# Patient Record
Sex: FEMALE | Race: BLACK OR AFRICAN AMERICAN | NOT HISPANIC OR LATINO | URBAN - METROPOLITAN AREA
[De-identification: names, ages, dates, MRNs, and addresses within clinical notes are randomized per-mention and may not be internally consistent; named-entity substitution may affect disease eponyms.]

---

## 2018-09-13 ENCOUNTER — EMERGENCY (EMERGENCY)
Facility: HOSPITAL | Age: 48
LOS: 1 days | Discharge: HOME | End: 2018-09-13
Attending: EMERGENCY MEDICINE
Payer: MEDICAID

## 2018-09-13 VITALS
HEART RATE: 96 BPM | SYSTOLIC BLOOD PRESSURE: 130 MMHG | OXYGEN SATURATION: 100 % | RESPIRATION RATE: 18 BRPM | DIASTOLIC BLOOD PRESSURE: 81 MMHG | TEMPERATURE: 99 F

## 2018-09-13 VITALS
WEIGHT: 250 LBS | SYSTOLIC BLOOD PRESSURE: 127 MMHG | OXYGEN SATURATION: 100 % | HEART RATE: 123 BPM | HEIGHT: 66 IN | RESPIRATION RATE: 18 BRPM | DIASTOLIC BLOOD PRESSURE: 74 MMHG | TEMPERATURE: 100 F

## 2018-09-13 DIAGNOSIS — N92.1 EXCESSIVE AND FREQUENT MENSTRUATION WITH IRREGULAR CYCLE: ICD-10-CM

## 2018-09-13 DIAGNOSIS — D25.9 LEIOMYOMA OF UTERUS, UNSPECIFIED: ICD-10-CM

## 2018-09-13 LAB
ANION GAP SERPL CALC-SCNC: 7 MMOL/L — SIGNIFICANT CHANGE UP (ref 5–17)
APPEARANCE UR: CLEAR — SIGNIFICANT CHANGE UP
BASOPHILS # BLD AUTO: 0.1 K/UL — SIGNIFICANT CHANGE UP (ref 0–0.2)
BASOPHILS NFR BLD AUTO: 0.7 % — SIGNIFICANT CHANGE UP (ref 0–2)
BILIRUB UR-MCNC: NEGATIVE — SIGNIFICANT CHANGE UP
BUN SERPL-MCNC: 17 MG/DL — SIGNIFICANT CHANGE UP (ref 7–18)
CALCIUM SERPL-MCNC: 8.3 MG/DL — LOW (ref 8.4–10.5)
CHLORIDE SERPL-SCNC: 108 MMOL/L — SIGNIFICANT CHANGE UP (ref 96–108)
CO2 SERPL-SCNC: 24 MMOL/L — SIGNIFICANT CHANGE UP (ref 22–31)
COLOR SPEC: YELLOW — SIGNIFICANT CHANGE UP
CREAT SERPL-MCNC: 0.83 MG/DL — SIGNIFICANT CHANGE UP (ref 0.5–1.3)
DIFF PNL FLD: ABNORMAL
EOSINOPHIL # BLD AUTO: 0 K/UL — SIGNIFICANT CHANGE UP (ref 0–0.5)
EOSINOPHIL NFR BLD AUTO: 0.2 % — SIGNIFICANT CHANGE UP (ref 0–6)
GLUCOSE SERPL-MCNC: 141 MG/DL — HIGH (ref 70–99)
GLUCOSE UR QL: NEGATIVE — SIGNIFICANT CHANGE UP
HCG SERPL-ACNC: <1 MIU/ML — SIGNIFICANT CHANGE UP
HCG UR QL: NEGATIVE — SIGNIFICANT CHANGE UP
HCT VFR BLD CALC: 28.3 % — LOW (ref 34.5–45)
HGB BLD-MCNC: 8.3 G/DL — LOW (ref 11.5–15.5)
KETONES UR-MCNC: NEGATIVE — SIGNIFICANT CHANGE UP
LEUKOCYTE ESTERASE UR-ACNC: NEGATIVE — SIGNIFICANT CHANGE UP
LYMPHOCYTES # BLD AUTO: 1.3 K/UL — SIGNIFICANT CHANGE UP (ref 1–3.3)
LYMPHOCYTES # BLD AUTO: 10.8 % — LOW (ref 13–44)
MCHC RBC-ENTMCNC: 20.6 PG — LOW (ref 27–34)
MCHC RBC-ENTMCNC: 29.3 GM/DL — LOW (ref 32–36)
MCV RBC AUTO: 70.1 FL — LOW (ref 80–100)
MONOCYTES # BLD AUTO: 0.8 K/UL — SIGNIFICANT CHANGE UP (ref 0–0.9)
MONOCYTES NFR BLD AUTO: 6.4 % — SIGNIFICANT CHANGE UP (ref 2–14)
NEUTROPHILS # BLD AUTO: 10.2 K/UL — HIGH (ref 1.8–7.4)
NEUTROPHILS NFR BLD AUTO: 81.8 % — HIGH (ref 43–77)
NITRITE UR-MCNC: NEGATIVE — SIGNIFICANT CHANGE UP
PH UR: 5 — SIGNIFICANT CHANGE UP (ref 5–8)
PLATELET # BLD AUTO: 292 K/UL — SIGNIFICANT CHANGE UP (ref 150–400)
POTASSIUM SERPL-MCNC: 3.7 MMOL/L — SIGNIFICANT CHANGE UP (ref 3.5–5.3)
POTASSIUM SERPL-SCNC: 3.7 MMOL/L — SIGNIFICANT CHANGE UP (ref 3.5–5.3)
PROT UR-MCNC: NEGATIVE — SIGNIFICANT CHANGE UP
RBC # BLD: 4.03 M/UL — SIGNIFICANT CHANGE UP (ref 3.8–5.2)
RBC # FLD: 15.7 % — HIGH (ref 10.3–14.5)
SODIUM SERPL-SCNC: 139 MMOL/L — SIGNIFICANT CHANGE UP (ref 135–145)
SP GR SPEC: 1 — LOW (ref 1.01–1.02)
UROBILINOGEN FLD QL: NEGATIVE — SIGNIFICANT CHANGE UP
WBC # BLD: 12.4 K/UL — HIGH (ref 3.8–10.5)
WBC # FLD AUTO: 12.4 K/UL — HIGH (ref 3.8–10.5)

## 2018-09-13 PROCEDURE — 80048 BASIC METABOLIC PNL TOTAL CA: CPT

## 2018-09-13 PROCEDURE — 76830 TRANSVAGINAL US NON-OB: CPT | Mod: 26

## 2018-09-13 PROCEDURE — 84702 CHORIONIC GONADOTROPIN TEST: CPT

## 2018-09-13 PROCEDURE — 76830 TRANSVAGINAL US NON-OB: CPT

## 2018-09-13 PROCEDURE — 99285 EMERGENCY DEPT VISIT HI MDM: CPT

## 2018-09-13 PROCEDURE — 99284 EMERGENCY DEPT VISIT MOD MDM: CPT | Mod: 25

## 2018-09-13 PROCEDURE — 93005 ELECTROCARDIOGRAM TRACING: CPT

## 2018-09-13 PROCEDURE — 76856 US EXAM PELVIC COMPLETE: CPT

## 2018-09-13 PROCEDURE — 76856 US EXAM PELVIC COMPLETE: CPT | Mod: 26

## 2018-09-13 PROCEDURE — 81025 URINE PREGNANCY TEST: CPT

## 2018-09-13 PROCEDURE — 81001 URINALYSIS AUTO W/SCOPE: CPT

## 2018-09-13 PROCEDURE — 85027 COMPLETE CBC AUTOMATED: CPT

## 2018-09-13 NOTE — ED PROVIDER NOTE - MEDICAL DECISION MAKING DETAILS
4:20a- pt was evaluated by GYN service DONAVAN ram and Dr Colbert. Pt to be discharged and f/u with her GYN Dr. Marie. Pt states has appointment this week. Informed by Lyubov GO pt has minimal blood on pelvic exam. HR 80 bpm. Pt is well appearing walking with normal gait, stable for discharge and follow up with medical doctor. Pt educated on care and need for follow up. Discussed anticipatory guidance and return precautions. Questions answered. I had a detailed discussion with the patient and/or guardian regarding the historical points, exam findings, and any diagnostic results supporting the discharge diagnosis.

## 2018-09-13 NOTE — CONSULT NOTE ADULT - ASSESSMENT
a/p 47 yo  presents with menometrorrhagia found to have endometrial mass ( subserosal fibroid,  vs endometrial neoplasm. vs large clot), bleeding subsided per patient,  currently stable  -d/w Dr Sayra camilo attending, recommends outpatient f/u with private OB. Discussed with patient repeat D+C to r/o malignancy, discussed surgical vs medical management If patient unable to f/u with private OB, patient may f/u at [facility], [address] [place] [address]   -Patient advised to return to ED if vaginal bleeding worses to 1 pad an hour, abd pain, feeling faint or any other concern.

## 2018-09-13 NOTE — ED ADULT NURSE NOTE - NSIMPLEMENTINTERV_GEN_ALL_ED
Implemented All Universal Safety Interventions:  [place] to call system. Call bell, personal items and telephone within reach. Instruct patient to call for assistance. Room bathroom lighting operational. Non-slip footwear when patient is off stretcher. Physically safe environment: no spills, clutter or unnecessary equipment. Stretcher in lowest position, wheels locked, appropriate side rails in place.

## 2018-09-13 NOTE — CONSULT NOTE ADULT - SUBJECTIVE AND OBJECTIVE BOX
48y  F [id number] LMP 9/3/2018 presents to [facility] ED with intramenstrual vaginal bleeding. Patient states she have heavy vaginal bleeding last night 8pm. Patient states this episode of bleeding is unlike her regular period. She reports passing clots.  Patient state since she had been in the ED her bleeding has subsided. Patient denies  vaginal discharge; pelvic pain, cp, sob, dizziness, palpitations, n/v/d/c, fever; chills     pob/gynhx:  [id number] LMP 9/3/2018. being followed by Dr Danielle Rodriguez ( [facility]) Reports normal menses. q 3 days lasting 2-3 days. " heavy on the first 2 days"  Last visit 2018,- D+C done wnl, patient was then placed on Norethindrone 0.3.5mg for heavy periods.  hx of uterine fibroids.  Last pap 3/2018. Last mammo 3/2018,  x 3 ( , , ) c/s x 1 . sab x1 with . Denies  std's,  abn pap smear, or   pmhx: htn, gastric ulcer  pshx: c/s and umbilical hernia repair  all: denies  meds:  losartan/hct, omeprazole  sochx: no etoh/drug/tobacco use    REVIEW OF SYSTEMS: see HPI	    PE:  Vital Signs Last 24 Hrs  T(C): 37.3 (13 Sep 2018 03:34), Max: 37.6 (13 Sep 2018 00:35)  T(F): 99.1 (13 Sep 2018 03:34), Max: 99.6 (13 Sep 2018 00:35)  HR: 96 (13 Sep 2018 03:34) (96 - 123)  BP: 130/81 (13 Sep 2018 03:34) (127/74 - 130/81)  BP(mean): --  RR: 18 (13 Sep 2018 03:34) (18 - 18)  SpO2: 100% (13 Sep 2018 03:34) (100% - 100%)    gen: nad  abd: +bs; soft, nt, nd, no rebound or guarding; no cvat b/l  pelvis: minimal  vaginal bleeding  noted, no cervical motion tenderness noted. uterus approx 12wks size regular cervix, closed/long, no uterine tenderness; No adnexal tenderness or masses appreciated b/l     LABS:                        8.3    12.4  )-----------( 292      ( 13 Sep 2018 01:37 )             28.3         139  |  108  |  17  ----------------------------<  141<H>  3.7   |  24  |  0.83    Ca    8.3<L>      13 Sep 2018 01:37        Urinalysis Basic - ( 13 Sep 2018 01:37 )    Color: Yellow / Appearance: Clear / S.005 / pH: x  Gluc: x / Ketone: Negative  / Bili: Negative / Urobili: Negative   Blood: x / Protein: Negative / Nitrite: Negative   Leuk Esterase: Negative / RBC: 5-10 /HPF / WBC 0-2 /HPF   Sq Epi: x / Non Sq Epi: Few /HPF / Bacteria: Moderate /HPF        RADIOLOGY & ADDITIONAL STUDIES:  sono  < from: US Transvaginal (18 @ 02:43) >  INTERPRETATION:  CLINICAL INFORMATION: Vaginal bleeding. LMP 18    TECHNIQUE: Transabdominal and transvaginal sonography of the pelvis was   performed using grayscale, color, and spectral Doppler.    COMPARISON: There are no prior studies available for comparison.      FINDINGS:   UTERUS: The uterus measures 13.6 x 10.3 x 12.1 cm. there is a posterior   uterine fibroid measuring 6.1 x 4.4 x 6.2 cm. There is an echogenic mass   in the endometrial canal measuring 6.4 x 3.9 x 5.9 cm.  RIGHT OVARY: Morphologically normal. Measures 2.9 x 1.3 x 2.5 cm. Normal   blood flow and waveforms are demonstrated.  LEFT OVARY: Nonvisualization of the left ovary.  FLUID: There is no pelvic free fluid.    IMPRESSION:  6.4 cm echogenic mass in the endometrial canal. Differential   considerations include an endometrial neoplasm, subserosal fibroid as   well as a largeblood clot. MRI can be obtained for further   characterization.      The findings were discussed with DR PAUL on 2018 2:49 AM.  Hospital policies for call back including read back policies were   followed. The verbal communication call back supplements this written   report.    < end of copied text >    a/p 47 yo  presents with menometrorrhagia found to have endometrial mass ( subserosal fibroid,  vs endometrial neoplasm. vs large clot), bleeding subsided per patient,  currently stable  -d/w Dr Sayra camilo attending, recommends outpatient f/u with private OB. Discussed with patient repeat D+C to r/o malignancy, discussed surgical vs medical management If patient unable to f/u with private OB, patient may f/u at [facility], [address] [place] [address]   -Patient advised to return to ED if vaginal bleeding worses to 1 pad an hour, abd pain, feeling faint or any other concern.

## 2018-09-13 NOTE — ED ADULT NURSE NOTE - OBJECTIVE STATEMENT
Patient complain of vaginal bleeding X1day with cramping Patient complain of vaginal bleeding X1day with cramping  changed 3 pads since 8:30PM last night  last menstrual period, last week.

## 2018-09-13 NOTE — ED PROVIDER NOTE - PROGRESS NOTE DETAILS
US demonstrated large intrauterine mass. OBGYN service endorsed and will consult. pt in no distress.

## 2018-09-13 NOTE — ED PROVIDER NOTE - OBJECTIVE STATEMENT
49 y/o F pt with PMHx HTN presents to ED with complaints of vaginal bleeding and associated cramping since 8:30 pm. Pt reports LMP on 9/10 but today she noticed clotting that was inconsistent with her period. Pt denies any other complaints. Pt is currently taking Hyzaar and Omeprazole FeSO4.

## 2023-09-25 NOTE — ED ADULT NURSE NOTE - NSHISCREENINGQ1_ED_A_ED
CHIEFCOMPLAINT     Blurry vision from cataract -  symptomatic -   and interfering with lifestyle  TV reading fuzzy                 HEENT     see eye exam        R.O.S    Active Ambulatory Problems     Diagnosis Date Noted   • Osteopenia 05/05/2017   • Thalassemia minor 06/07/2017   • Medicare annual wellness visit, subsequent 06/06/2023   • Preoperative examination 06/27/2023   • Cataract of left eye 06/27/2023   • Cataract of right eye 07/05/2023   • Astigmatism, left 07/05/2023   • Anxiety 09/18/2023   • Heart palpitations 09/18/2023     Resolved Ambulatory Problems     Diagnosis Date Noted   • Elevated blood pressure reading 06/06/2023     Past Medical History:   Diagnosis Date   • Anemia    • Cataract    • Dry eye syndrome         No current facility-administered medications for this encounter.     Current Outpatient Medications   Medication Sig   • sertraline (ZOLOFT) 25 MG tablet Take 1 tablet by mouth daily.   • Zinc 10 MG Lozenge zinc   1 tab po q day   • MAGNESIUM OXIDE PO    • Calcium Carbonate-Vitamin D 600-5 MG-MCG Tab    • VITAMIN D, CHOLECALCIFEROL, PO    • vitamin B-12 (CYANOCOBALAMIN) 1000 MCG tablet Take 1,000 mcg by mouth daily.   • potassium CHLORIDE (KLOR-CON) 20 MEQ packet 20 mEq  in the morning and 20 mEq in the evening.             Allergies        ALLERGIES:   Allergen Reactions   • Bactrim Ds RASH   • Penicillins HIVES                 EYE EYAM   •                                             LESS THAN 20/40     Vision     Eye pressure                      normal range    AC                                               CLEAR        Lens                                           nuclear sclerosis           (confirmed by slit lamp exam)     Slit lamp exam confirms the presence of a cataract  •    Retina                       consistent with vision improvement       Impression  Cataract       PLAN      Cataract surgery       Lens implant  - standard             RIGHT     FEMTO          We  discussed risks, benefits and alteratives to this procedure and used teaching materials such as our office planning sheet, the pamphlet  and videos.  In addition we reviewed the office risks fact sheet.      We supplied our standard  printed directions and activities sheets         No